# Patient Record
(demographics unavailable — no encounter records)

---

## 2025-04-17 NOTE — ASSESSMENT
[Vaccines Reviewed] : Immunizations reviewed today. Please see immunization details in the vaccine log within the immunization flowsheet.  [FreeTextEntry1] : TIMOTEO ROB is a 25 year old female here for a physical exam.  She has a history of anxiety treated with Sertraline. She has been under more stress at work recently and her uncle  last month. She requested a prescription for Xanax in about 2 months ago. She has used about 20 of the 30 pills prescribed.  She has a personal and family history of elevated cholesterol. She was hospitalized in 2024 with TIA symptoms. She was started on aspirin and Lipitor and referred to cardiology. She saw Miya Nice NP at [facility] and a neurologist, Dr. Segura. She was told that she could stop the aspirin and Lipitor.

## 2025-04-17 NOTE — HISTORY OF PRESENT ILLNESS
[FreeTextEntry1] : TIMOTEO ROB is a 25 year old female here for a physical exam. [de-identified] : Her last physical exam was last year  Vaccines: Tetanus is up to date, 8/3/2022  Her last dentist visit was a few years ago Her last eye doctor appointment was less than one year ago Her last dermatologist visit was less than one year ago  GYN visit is up to date  Her diet is healthy overall Exercise: Pilates, running

## 2025-04-17 NOTE — HEALTH RISK ASSESSMENT
[0] : 2) Feeling down, depressed, or hopeless: Not at all (0) [PHQ-2 Negative - No further assessment needed] : PHQ-2 Negative - No further assessment needed [Never] : Never [[id number]] : 0

## 2025-04-17 NOTE — HISTORY OF PRESENT ILLNESS
[FreeTextEntry1] : TIMOTEO ROB is a 25 year old female here for a physical exam. [de-identified] : Her last physical exam was last year  Vaccines: Tetanus is up to date, 8/3/2022  Her last dentist visit was a few years ago Her last eye doctor appointment was less than one year ago Her last dermatologist visit was less than one year ago  GYN visit is up to date  Her diet is healthy overall Exercise: Pilates, running

## 2025-04-17 NOTE — ASSESSMENT
[Vaccines Reviewed] : Immunizations reviewed today. Please see immunization details in the vaccine log within the immunization flowsheet.  [FreeTextEntry1] : TIMOTEO ROB is a 25 year old female here for a physical exam.  She has a history of anxiety treated with Sertraline. She has been under more stress at work recently and her uncle  last month. She requested a prescription for Xanax in about 2 months ago. She has used about 20 of the 30 pills prescribed.  She has a personal and family history of elevated cholesterol. She was hospitalized in 2024 with TIA symptoms. She was started on aspirin and Lipitor and referred to cardiology. She saw Miya Nice NP at [facility] and a neurologist, Dr. Segura. She was told that she could stop the aspirin and Lipitor.

## 2025-04-17 NOTE — HEALTH RISK ASSESSMENT
[0] : 2) Feeling down, depressed, or hopeless: Not at all (0) [PHQ-2 Negative - No further assessment needed] : PHQ-2 Negative - No further assessment needed [Never] : Never [[id number]] : 0

## 2025-04-17 NOTE — PLAN
[FreeTextEntry1] : Continue all medications as prescribed. Check labs as above. She is not fasting and will go to a lab for blood work. Will adjust any medications based upon lab results.  Reviewed age-appropriate preventive screening tests with patient.  Discussed clean eating (eg Mediterranean style eating plan) and regular exercise/staying as physically active as possible.  Include balance exercises and strength training and core strengthening exercises for bone health and to decrease risk for falls.  Reviewed importance of good self care (e.g. meditation, yoga, adequate rest, regular exercise, magnesium, clean eating, etc.).  Follow up for next physical in one to two years.